# Patient Record
Sex: FEMALE | Race: WHITE
[De-identification: names, ages, dates, MRNs, and addresses within clinical notes are randomized per-mention and may not be internally consistent; named-entity substitution may affect disease eponyms.]

---

## 2018-11-26 ENCOUNTER — HOSPITAL ENCOUNTER (OUTPATIENT)
Dept: HOSPITAL 92 - SLEEPLAB | Age: 63
Discharge: HOME | End: 2018-11-26
Payer: MEDICARE

## 2018-11-26 DIAGNOSIS — I10: ICD-10-CM

## 2018-11-26 DIAGNOSIS — E66.9: ICD-10-CM

## 2018-11-26 DIAGNOSIS — G47.33: Primary | ICD-10-CM

## 2018-11-26 DIAGNOSIS — R53.83: ICD-10-CM

## 2018-11-26 PROCEDURE — 95811 POLYSOM 6/>YRS CPAP 4/> PARM: CPT

## 2019-04-11 ENCOUNTER — HOSPITAL ENCOUNTER (EMERGENCY)
Dept: HOSPITAL 92 - ERS | Age: 64
Discharge: HOME | End: 2019-04-11
Payer: MEDICARE

## 2019-04-11 DIAGNOSIS — K57.32: Primary | ICD-10-CM

## 2019-04-11 DIAGNOSIS — E03.9: ICD-10-CM

## 2019-04-11 DIAGNOSIS — I10: ICD-10-CM

## 2019-04-11 LAB
ALBUMIN SERPL BCG-MCNC: 4.1 G/DL (ref 3.4–4.8)
ALP SERPL-CCNC: 92 U/L (ref 40–150)
ALT SERPL W P-5'-P-CCNC: 17 U/L (ref 8–55)
ANION GAP SERPL CALC-SCNC: 15 MMOL/L (ref 10–20)
AST SERPL-CCNC: 13 U/L (ref 5–34)
BASOPHILS # BLD AUTO: 0 THOU/UL (ref 0–0.2)
BASOPHILS NFR BLD AUTO: 0.1 % (ref 0–1)
BILIRUB SERPL-MCNC: 0.6 MG/DL (ref 0.2–1.2)
BUN SERPL-MCNC: 14 MG/DL (ref 9.8–20.1)
CALCIUM SERPL-MCNC: 9.4 MG/DL (ref 7.8–10.44)
CHLORIDE SERPL-SCNC: 106 MMOL/L (ref 98–107)
CO2 SERPL-SCNC: 24 MMOL/L (ref 23–31)
CREAT CL PREDICTED SERPL C-G-VRATE: 0 ML/MIN (ref 70–130)
EOSINOPHIL # BLD AUTO: 0.2 THOU/UL (ref 0–0.7)
EOSINOPHIL NFR BLD AUTO: 2.6 % (ref 0–10)
GLOBULIN SER CALC-MCNC: 2.7 G/DL (ref 2.4–3.5)
GLUCOSE SERPL-MCNC: 87 MG/DL (ref 80–115)
HGB BLD-MCNC: 12.2 G/DL (ref 12–16)
LIPASE SERPL-CCNC: 31 U/L (ref 8–78)
LYMPHOCYTES # BLD: 2.3 THOU/UL (ref 1.2–3.4)
LYMPHOCYTES NFR BLD AUTO: 25.4 % (ref 21–51)
MCH RBC QN AUTO: 26.7 PG (ref 27–31)
MCV RBC AUTO: 84.2 FL (ref 78–98)
MONOCYTES # BLD AUTO: 0.8 THOU/UL (ref 0.11–0.59)
MONOCYTES NFR BLD AUTO: 8.4 % (ref 0–10)
NEUTROPHILS # BLD AUTO: 5.8 THOU/UL (ref 1.4–6.5)
NEUTROPHILS NFR BLD AUTO: 63.6 % (ref 42–75)
PLATELET # BLD AUTO: 255 THOU/UL (ref 130–400)
POTASSIUM SERPL-SCNC: 3.9 MMOL/L (ref 3.5–5.1)
RBC # BLD AUTO: 4.56 MILL/UL (ref 4.2–5.4)
SODIUM SERPL-SCNC: 141 MMOL/L (ref 136–145)
SP GR UR STRIP: 1.01 (ref 1–1.04)
WBC # BLD AUTO: 9.2 THOU/UL (ref 4.8–10.8)

## 2019-04-11 PROCEDURE — 36415 COLL VENOUS BLD VENIPUNCTURE: CPT

## 2019-04-11 PROCEDURE — 74177 CT ABD & PELVIS W/CONTRAST: CPT

## 2019-04-11 PROCEDURE — 80053 COMPREHEN METABOLIC PANEL: CPT

## 2019-04-11 PROCEDURE — 85025 COMPLETE CBC W/AUTO DIFF WBC: CPT

## 2019-04-11 PROCEDURE — 81003 URINALYSIS AUTO W/O SCOPE: CPT

## 2019-04-11 PROCEDURE — 83690 ASSAY OF LIPASE: CPT

## 2019-04-11 NOTE — CT
CT of abdomen and pelvis:

4/11/2019



COMPARISON: 7/3/2018



HISTORY: Left lower quadrant pain



TECHNIQUE: Axial CT imaging obtained at 5 mm intervals from the lung bases through the pubic symphysi
s with IV contrast. Coronal reformatted imaging obtained.



FINDINGS: The imaged lung bases are unremarkable. No free intraperitoneal air.



There is a stable cyst within the anterior aspect of the left lobe of the liver measuring 4.9 cm. A s
econd smaller hypodense stable lesion suggesting an additional cyst noted in the anterior aspect of t
he left lobe of liver measuring 1.2 cm.



The spleen, gallbladder, pancreas, adrenal glands, and kidneys are unremarkable.



There is multifocal diverticulosis of the descending colon and the sigmoid colon. There is a focal ar
ea of pericolonic inflammatory change involving the sigmoid colon with associated colonic wall thicke
jagruti, evidence of acute sigmoid diverticulitis. This involves a segment of the sigmoid colon within t
he left lower quadrant measuring approximately 5 cm in length. No associated abscess.



There is a suture line at the level of the cecal tip suggesting prior appendectomy.



There is a small sliding-type bowel hernia. There is scattered atherosclerotic calcification of the a
bdominal aorta and its branches. No lymphadenopathy is seen in the pelvis, mesentery, or retroperiton
eum. Calcified lesion within the uterus noted, evidence of stable uterine fibroid.



Review of the osseous structures demonstrates no worrisome lytic or blastic lesion. Multilevel lower 
lumbar spine facet hypertrophic change seen.



IMPRESSION: Findings most consistent with sigmoid diverticulitis. These findings are similar when com
pared to the 7/3/2018 examination. Recommend direct visualization following treatment to exclude an u
nderlying lesion within the colon.



Reported By: Kush Stearns 

Electronically Signed:  4/11/2019 12:10 PM

## 2019-12-04 ENCOUNTER — HOSPITAL ENCOUNTER (OUTPATIENT)
Dept: HOSPITAL 92 - BICMAMMO | Age: 64
Discharge: HOME | End: 2019-12-04
Attending: FAMILY MEDICINE
Payer: MEDICARE

## 2019-12-04 DIAGNOSIS — Z12.31: Primary | ICD-10-CM

## 2019-12-04 DIAGNOSIS — Z80.3: ICD-10-CM

## 2019-12-04 PROCEDURE — 77067 SCR MAMMO BI INCL CAD: CPT

## 2019-12-04 PROCEDURE — 77063 BREAST TOMOSYNTHESIS BI: CPT

## 2019-12-27 NOTE — MMO
Bilateral MAMMO Bilat Screen [facility].

 

CLINICAL HISTORY:

Patient is 64 years old and is seen for screening. The patient has the following

family history of breast cancer:  mother, at age 62.  The patient has no

personal history of cancer.

 

VIEWS:

The views performed were:  bilateral craniocaudal with tomosynthesis and

bilateral mediolateral oblique with tomosynthesis.

 

FILMS COMPARED:

The present examination has been compared to prior imaging studies performed at

[facility] on 04/01/2015, 04/05/2016 and 10/12/2018.

 

This study has been interpreted with the assistance of computer-aided detection.

 

MAMMOGRAM FINDINGS:

There are scattered fibroglandular densities.

 

There are no suspicious masses, suspicious calcifications, or new areas of

architectural distortion.

 

IMPRESSION:

THERE IS NO MAMMOGRAPHIC EVIDENCE OF MALIGNANCY.

 

A ROUTINE FOLLOW-UP MAMMOGRAM IN 1 YEAR IS RECOMMENDED.

 

THE RESULTS OF THIS EXAM WERE SENT TO THE PATIENT.

 

ACR BI-RADS Category 1 - Negative

 

MAMMOGRAPHY NOTE:

 1. A negative mammogram report should not delay a biopsy if a dominant of

 clinically suspicious mass is present.

 2. Approximately 10% to 15% of breast cancers are not detected by

 mammography.

 3. Adenosis and dense breasts may obscure an underlying neoplasm.

 

 

Reported by: GAUTAM JEREZ MD

Electonically Signed: [id number]

## 2020-01-23 ENCOUNTER — HOSPITAL ENCOUNTER (OUTPATIENT)
Dept: HOSPITAL 92 - BICRAD | Age: 65
Discharge: HOME | End: 2020-01-23
Attending: FAMILY MEDICINE
Payer: MEDICARE

## 2020-01-23 DIAGNOSIS — M47.812: ICD-10-CM

## 2020-01-23 DIAGNOSIS — M43.12: ICD-10-CM

## 2020-01-23 DIAGNOSIS — R20.0: Primary | ICD-10-CM

## 2020-01-23 PROCEDURE — 80061 LIPID PANEL: CPT

## 2020-01-23 PROCEDURE — 84443 ASSAY THYROID STIM HORMONE: CPT

## 2020-01-23 PROCEDURE — 84550 ASSAY OF BLOOD/URIC ACID: CPT

## 2020-01-23 PROCEDURE — 80053 COMPREHEN METABOLIC PANEL: CPT

## 2020-01-23 PROCEDURE — 36415 COLL VENOUS BLD VENIPUNCTURE: CPT

## 2020-01-23 PROCEDURE — 72040 X-RAY EXAM NECK SPINE 2-3 VW: CPT
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
## 2020-02-13 ENCOUNTER — HOSPITAL ENCOUNTER (OUTPATIENT)
Dept: HOSPITAL 92 - BICMRI | Age: 65
Discharge: HOME | End: 2020-02-13
Attending: FAMILY MEDICINE
Payer: MEDICARE

## 2020-02-13 DIAGNOSIS — M47.812: ICD-10-CM

## 2020-02-13 DIAGNOSIS — R20.0: Primary | ICD-10-CM

## 2020-02-13 PROCEDURE — 72141 MRI NECK SPINE W/O DYE: CPT

## 2020-02-13 NOTE — MRI
CERVICAL SPINE MRI WITHOUT CONTRAST:

 

DATE: 2/13/2020.

 

COMPARISON: 

None.

 

HISTORY: 

Bilateral hand numbness, neck pain.

 

TECHNIQUE: 

Multiplanar, multisequence MR imaging of the cervical spine without contrast.

 

FINDINGS: 

Sagittal STIR imaging demonstrates no focal area of osseous marrow edema.

 

Craniocervical and cervicothoracic junction appears intact.  There is minimal retrolisthesis at C4-5 
measuring in the 2-3 mm range.

 

C2-3:  No significant central canal or neural foraminal stenosis.

 

C3-4:  Mild disk space narrowing.  Mild bilateral facet hypertrophy.  No significant central canal or
 neural foraminal stenosis.

 

C4-5:  There is disk space narrowing with disk desiccation and a small central/left paracentral disk 
protrusion.  This causes a mild degree of central canal stenosis.  There is mild bilateral neural for
aminal stenosis, left greater than right, on the basis of facet and uncovertebral osteophyte formatio
n.

 

C5-6:  There is disk space narrowing and disk desiccation with disk bulge and a small central/left pa
racentral disk protrusion.  There is associated mild central canal stenosis.  Mild left-sided neural 
foraminal stenosis on the basis of facet and uncovertebral osteophyte formation.  No significant righ
t neural foraminal stenosis.

 

C6-7:  There is disk space narrowing with disk desiccation and disk bulge partially effacing the vent
ral thecal sac with a mild degree of central canal stenosis.  Bilateral facet and uncovertebral osteo
phyte formation noted with mild bilateral neural foraminal stenosis, left greater than right.

 

C7-T1:  No significant central canal or neural foraminal stenosis.

 

No focal area of abnormal signal intensity identified within the cervical cord.

 

IMPRESSION: 

Cervical spine degenerative change as detailed above.

 

POS: [facility]

## 2021-04-21 ENCOUNTER — HOSPITAL ENCOUNTER (OUTPATIENT)
Dept: HOSPITAL 92 - BICMAMMO | Age: 66
Discharge: HOME | End: 2021-04-21
Attending: FAMILY MEDICINE
Payer: MEDICARE

## 2021-04-21 DIAGNOSIS — Z12.31: Primary | ICD-10-CM

## 2021-04-21 DIAGNOSIS — Z80.3: ICD-10-CM

## 2021-04-21 DIAGNOSIS — M85.89: ICD-10-CM

## 2021-04-21 PROCEDURE — 77067 SCR MAMMO BI INCL CAD: CPT

## 2021-04-21 PROCEDURE — 77080 DXA BONE DENSITY AXIAL: CPT

## 2021-04-21 PROCEDURE — 77063 BREAST TOMOSYNTHESIS BI: CPT

## 2022-04-26 ENCOUNTER — HOSPITAL ENCOUNTER (OUTPATIENT)
Dept: HOSPITAL 92 - BICMAMMO | Age: 67
Discharge: HOME | End: 2022-04-26
Attending: FAMILY MEDICINE
Payer: MEDICARE

## 2022-04-26 DIAGNOSIS — Z80.3: ICD-10-CM

## 2022-04-26 DIAGNOSIS — Z12.31: Primary | ICD-10-CM

## 2022-04-26 PROCEDURE — 77063 BREAST TOMOSYNTHESIS BI: CPT

## 2022-04-26 PROCEDURE — 77067 SCR MAMMO BI INCL CAD: CPT

## 2022-09-07 ENCOUNTER — HOSPITAL ENCOUNTER (OUTPATIENT)
Dept: HOSPITAL 92 - BICCT | Age: 67
Discharge: HOME | End: 2022-09-07
Attending: NURSE PRACTITIONER
Payer: MEDICARE

## 2022-09-07 DIAGNOSIS — G89.29: ICD-10-CM

## 2022-09-07 DIAGNOSIS — R51.9: ICD-10-CM

## 2022-09-07 DIAGNOSIS — R68.84: Primary | ICD-10-CM

## 2022-09-07 PROCEDURE — 70487 CT MAXILLOFACIAL W/DYE: CPT
